# Patient Record
Sex: FEMALE | Race: WHITE | NOT HISPANIC OR LATINO
[De-identification: names, ages, dates, MRNs, and addresses within clinical notes are randomized per-mention and may not be internally consistent; named-entity substitution may affect disease eponyms.]

---

## 2024-11-18 ENCOUNTER — DASHBOARD ENCOUNTERS (OUTPATIENT)
Age: 59
End: 2024-11-18

## 2024-11-18 ENCOUNTER — OFFICE VISIT (OUTPATIENT)
Dept: URBAN - METROPOLITAN AREA CLINIC 74 | Facility: CLINIC | Age: 59
End: 2024-11-18
Payer: COMMERCIAL

## 2024-11-18 ENCOUNTER — TELEPHONE ENCOUNTER (OUTPATIENT)
Dept: URBAN - METROPOLITAN AREA CLINIC 74 | Facility: CLINIC | Age: 59
End: 2024-11-18

## 2024-11-18 VITALS
BODY MASS INDEX: 26.9 KG/M2 | SYSTOLIC BLOOD PRESSURE: 184 MMHG | DIASTOLIC BLOOD PRESSURE: 82 MMHG | TEMPERATURE: 98.1 F | HEIGHT: 63 IN | HEART RATE: 102 BPM | OXYGEN SATURATION: 93 % | WEIGHT: 151.8 LBS

## 2024-11-18 DIAGNOSIS — I10 HYPERTENSION, ESSENTIAL: ICD-10-CM

## 2024-11-18 DIAGNOSIS — K21.9 GERD WITHOUT ESOPHAGITIS: ICD-10-CM

## 2024-11-18 PROBLEM — 59621000: Status: ACTIVE | Noted: 2024-11-18

## 2024-11-18 PROBLEM — 266435005: Status: ACTIVE | Noted: 2024-11-18

## 2024-11-18 PROCEDURE — 99203 OFFICE O/P NEW LOW 30 MIN: CPT

## 2024-11-18 RX ORDER — PANTOPRAZOLE SODIUM 40 MG/1
1 TABLET 1/2 TO 1 HOUR BEFORE MORNING MEAL TABLET, DELAYED RELEASE ORAL ONCE A DAY
Qty: 90 TABLET | Refills: 3 | OUTPATIENT
Start: 2024-11-18

## 2024-11-18 NOTE — HPI-TODAY'S VISIT:
This is a 59 y.o. female with PMH significant for asthma, HTN, HLD, DM2, NELSON, polysubstance abuse (methamphetamine, THC, tobacco), and recent ischemic CVA (March). Presents on self referral for acid reflex. Patient reports EGD over 10 years ago was significant for ulcers. She has chronic hx of GERD which has worsened in the last month. She has not been taking anything for acid reflux. She reports daily acid reflux regardless of diet. She reports some nausea/vomiting about once a week. No dysphagia, abdominal pain, diarrhea, dark stools, or rectal bleeding. No previous colonoscopy.   Patient has hx of stroke in March this year on ASA and statin. Patient has been out of medications for many months, Has refills on the bottle but has not gotten them filled. She also has uncontrolled HTN due to medication non-compliance. Patient also follows with pulmonologist for asthma/COPD. Elevated BP in office today. Patient denies headache, chest pain, SOB, dizziness.

## 2024-12-18 ENCOUNTER — OFFICE VISIT (OUTPATIENT)
Dept: URBAN - METROPOLITAN AREA CLINIC 40 | Facility: CLINIC | Age: 59
End: 2024-12-18

## 2024-12-18 NOTE — HPI-TODAY'S VISIT:
This is a 59 y.o. female with PMH significant for asthma, HTN, HLD, DM2, NELSON, polysubstance abuse (methamphetamine, THC, tobacco), and recent ischemic CVA (March). Presents on self referral for acid reflex. Patient reports EGD over 10 years ago was significant for ulcers. She has chronic hx of GERD which has worsened in the last month. She has not been taking anything for acid reflux. She reports daily acid reflux regardless of diet. She reports some nausea/vomiting about once a week. No dysphagia, abdominal pain, diarrhea, dark stools, or rectal bleeding. No previous colonoscopy.   Patient has hx of stroke in March this year on ASA and statin. Patient has been out of medications for many months, Has refills on the bottle but has not gotten them filled. She also has uncontrolled HTN due to medication non-compliance. Patient also follows with pulmonologist for asthma/COPD. Elevated BP in office today. Patient denies headache, chest pain, SOB, dizziness.   -- Patient with uncontrolled HTN. Advised to follow up with PCP prior to scheduling procedures. H pylori breath test ordered but not completed.